# Patient Record
Sex: MALE | Race: WHITE | NOT HISPANIC OR LATINO | Employment: FULL TIME
[De-identification: names, ages, dates, MRNs, and addresses within clinical notes are randomized per-mention and may not be internally consistent; named-entity substitution may affect disease eponyms.]

---

## 2017-10-17 ENCOUNTER — HISTORIC RESULTS (OUTPATIENT)
Dept: ADMINISTRATIVE | Age: 37
End: 2017-10-17

## 2018-09-17 ENCOUNTER — RECORDS - HEALTHEAST (OUTPATIENT)
Dept: LAB | Facility: CLINIC | Age: 38
End: 2018-09-17

## 2018-09-17 LAB
ALBUMIN SERPL-MCNC: 3.8 G/DL (ref 3.5–5)
ALP SERPL-CCNC: 80 U/L (ref 45–120)
ALT SERPL W P-5'-P-CCNC: 19 U/L (ref 0–45)
ANION GAP SERPL CALCULATED.3IONS-SCNC: 7 MMOL/L (ref 5–18)
AST SERPL W P-5'-P-CCNC: 13 U/L (ref 0–40)
BASOPHILS # BLD AUTO: 0 THOU/UL (ref 0–0.2)
BASOPHILS NFR BLD AUTO: 0 % (ref 0–2)
BILIRUB SERPL-MCNC: 0.7 MG/DL (ref 0–1)
BUN SERPL-MCNC: 11 MG/DL (ref 8–22)
CALCIUM SERPL-MCNC: 9 MG/DL (ref 8.5–10.5)
CHLORIDE BLD-SCNC: 108 MMOL/L (ref 98–107)
CHOLEST SERPL-MCNC: 169 MG/DL
CO2 SERPL-SCNC: 22 MMOL/L (ref 22–31)
CREAT SERPL-MCNC: 0.77 MG/DL (ref 0.7–1.3)
EOSINOPHIL # BLD AUTO: 0.1 THOU/UL (ref 0–0.4)
EOSINOPHIL NFR BLD AUTO: 2 % (ref 0–6)
ERYTHROCYTE [DISTWIDTH] IN BLOOD BY AUTOMATED COUNT: 12.4 % (ref 11–14.5)
FASTING STATUS PATIENT QL REPORTED: YES
GFR SERPL CREATININE-BSD FRML MDRD: >60 ML/MIN/1.73M2
GLUCOSE BLD-MCNC: 209 MG/DL (ref 70–125)
HCT VFR BLD AUTO: 47.8 % (ref 40–54)
HDLC SERPL-MCNC: 26 MG/DL
HGB BLD-MCNC: 16.5 G/DL (ref 14–18)
LDLC SERPL CALC-MCNC: 116 MG/DL
LYMPHOCYTES # BLD AUTO: 3 THOU/UL (ref 0.8–4.4)
LYMPHOCYTES NFR BLD AUTO: 40 % (ref 20–40)
MCH RBC QN AUTO: 29.8 PG (ref 27–34)
MCHC RBC AUTO-ENTMCNC: 34.5 G/DL (ref 32–36)
MCV RBC AUTO: 86 FL (ref 80–100)
MONOCYTES # BLD AUTO: 0.6 THOU/UL (ref 0–0.9)
MONOCYTES NFR BLD AUTO: 8 % (ref 2–10)
NEUTROPHILS # BLD AUTO: 3.6 THOU/UL (ref 2–7.7)
NEUTROPHILS NFR BLD AUTO: 49 % (ref 50–70)
PLATELET # BLD AUTO: 200 THOU/UL (ref 140–440)
PMV BLD AUTO: 10.2 FL (ref 8.5–12.5)
POTASSIUM BLD-SCNC: 4.3 MMOL/L (ref 3.5–5)
PROT SERPL-MCNC: 6.6 G/DL (ref 6–8)
RBC # BLD AUTO: 5.54 MILL/UL (ref 4.4–6.2)
SODIUM SERPL-SCNC: 137 MMOL/L (ref 136–145)
TRIGL SERPL-MCNC: 135 MG/DL
WBC: 7.4 THOU/UL (ref 4–11)

## 2018-09-18 LAB — HBA1C MFR BLD: 8.8 % (ref 4.2–6.1)

## 2018-12-11 ENCOUNTER — RECORDS - HEALTHEAST (OUTPATIENT)
Dept: LAB | Facility: CLINIC | Age: 38
End: 2018-12-11

## 2018-12-11 LAB
ALBUMIN UR-MCNC: NEGATIVE MG/DL
AMORPH CRY #/AREA URNS HPF: ABNORMAL /[HPF]
APPEARANCE UR: ABNORMAL
BACTERIA #/AREA URNS HPF: ABNORMAL HPF
BILIRUB UR QL STRIP: NEGATIVE
COLOR UR AUTO: YELLOW
GLUCOSE UR STRIP-MCNC: ABNORMAL MG/DL
HGB UR QL STRIP: NEGATIVE
KETONES UR STRIP-MCNC: NEGATIVE MG/DL
LEUKOCYTE ESTERASE UR QL STRIP: NEGATIVE
MUCOUS THREADS #/AREA URNS LPF: ABNORMAL LPF
NITRATE UR QL: NEGATIVE
PH UR STRIP: 5 [PH] (ref 4.5–8)
RBC #/AREA URNS AUTO: ABNORMAL HPF
SP GR UR STRIP: 1.03 (ref 1–1.03)
SQUAMOUS #/AREA URNS AUTO: ABNORMAL LPF
UROBILINOGEN UR STRIP-ACNC: ABNORMAL
WBC #/AREA URNS AUTO: ABNORMAL HPF

## 2019-04-11 ENCOUNTER — RECORDS - HEALTHEAST (OUTPATIENT)
Dept: ADMINISTRATIVE | Facility: OTHER | Age: 39
End: 2019-04-11

## 2019-04-11 LAB
LAB AP CHARGES (HE HISTORICAL CONVERSION): NORMAL
PATH REPORT.COMMENTS IMP SPEC: NORMAL
PATH REPORT.FINAL DX SPEC: NORMAL
PATH REPORT.GROSS SPEC: NORMAL
PATH REPORT.MICROSCOPIC SPEC OTHER STN: NORMAL
PATH REPORT.RELEVANT HX SPEC: NORMAL
RESULT FLAG (HE HISTORICAL CONVERSION): NORMAL

## 2019-11-21 ENCOUNTER — RECORDS - HEALTHEAST (OUTPATIENT)
Dept: LAB | Facility: CLINIC | Age: 39
End: 2019-11-21

## 2019-11-21 LAB
ALBUMIN SERPL-MCNC: 3.7 G/DL (ref 3.5–5)
ALP SERPL-CCNC: 93 U/L (ref 45–120)
ALT SERPL W P-5'-P-CCNC: 16 U/L (ref 0–45)
ANION GAP SERPL CALCULATED.3IONS-SCNC: 6 MMOL/L (ref 5–18)
AST SERPL W P-5'-P-CCNC: 10 U/L (ref 0–40)
BASOPHILS # BLD AUTO: 0 THOU/UL (ref 0–0.2)
BASOPHILS NFR BLD AUTO: 1 % (ref 0–2)
BILIRUB SERPL-MCNC: 0.7 MG/DL (ref 0–1)
BUN SERPL-MCNC: 13 MG/DL (ref 8–22)
CALCIUM SERPL-MCNC: 8.8 MG/DL (ref 8.5–10.5)
CHLORIDE BLD-SCNC: 107 MMOL/L (ref 98–107)
CHOLEST SERPL-MCNC: 156 MG/DL
CO2 SERPL-SCNC: 24 MMOL/L (ref 22–31)
CREAT SERPL-MCNC: 0.85 MG/DL (ref 0.7–1.3)
EOSINOPHIL # BLD AUTO: 0.2 THOU/UL (ref 0–0.4)
EOSINOPHIL NFR BLD AUTO: 2 % (ref 0–6)
ERYTHROCYTE [DISTWIDTH] IN BLOOD BY AUTOMATED COUNT: 12.4 % (ref 11–14.5)
FASTING STATUS PATIENT QL REPORTED: ABNORMAL
GFR SERPL CREATININE-BSD FRML MDRD: >60 ML/MIN/1.73M2
GLUCOSE BLD-MCNC: 303 MG/DL (ref 70–125)
HCT VFR BLD AUTO: 48.3 % (ref 40–54)
HDLC SERPL-MCNC: 28 MG/DL
HGB BLD-MCNC: 16.7 G/DL (ref 14–18)
LDLC SERPL CALC-MCNC: 104 MG/DL
LYMPHOCYTES # BLD AUTO: 2.7 THOU/UL (ref 0.8–4.4)
LYMPHOCYTES NFR BLD AUTO: 39 % (ref 20–40)
MCH RBC QN AUTO: 29.7 PG (ref 27–34)
MCHC RBC AUTO-ENTMCNC: 34.6 G/DL (ref 32–36)
MCV RBC AUTO: 86 FL (ref 80–100)
MONOCYTES # BLD AUTO: 0.6 THOU/UL (ref 0–0.9)
MONOCYTES NFR BLD AUTO: 8 % (ref 2–10)
NEUTROPHILS # BLD AUTO: 3.4 THOU/UL (ref 2–7.7)
NEUTROPHILS NFR BLD AUTO: 50 % (ref 50–70)
PLATELET # BLD AUTO: 203 THOU/UL (ref 140–440)
PMV BLD AUTO: 10.6 FL (ref 8.5–12.5)
POTASSIUM BLD-SCNC: 4.5 MMOL/L (ref 3.5–5)
PROT SERPL-MCNC: 6.4 G/DL (ref 6–8)
RBC # BLD AUTO: 5.63 MILL/UL (ref 4.4–6.2)
SODIUM SERPL-SCNC: 137 MMOL/L (ref 136–145)
TRIGL SERPL-MCNC: 122 MG/DL
WBC: 6.8 THOU/UL (ref 4–11)

## 2019-11-22 LAB — HBA1C MFR BLD: 9.7 % (ref 4.2–6.1)

## 2020-02-04 ENCOUNTER — RECORDS - HEALTHEAST (OUTPATIENT)
Dept: ADMINISTRATIVE | Facility: OTHER | Age: 40
End: 2020-02-04

## 2020-02-18 ENCOUNTER — HOSPITAL ENCOUNTER (OUTPATIENT)
Dept: ULTRASOUND IMAGING | Facility: CLINIC | Age: 40
Discharge: HOME | End: 2020-02-18
Attending: SURGERY

## 2020-02-18 DIAGNOSIS — R10.84 ABDOMINAL PAIN, GENERALIZED: ICD-10-CM

## 2020-08-24 ENCOUNTER — RECORDS - HEALTHEAST (OUTPATIENT)
Dept: LAB | Facility: CLINIC | Age: 40
End: 2020-08-24

## 2020-08-24 LAB
ALBUMIN SERPL-MCNC: 4 G/DL (ref 3.5–5)
ALP SERPL-CCNC: 120 U/L (ref 45–120)
ALT SERPL W P-5'-P-CCNC: 14 U/L (ref 0–45)
ANION GAP SERPL CALCULATED.3IONS-SCNC: 8 MMOL/L (ref 5–18)
AST SERPL W P-5'-P-CCNC: 10 U/L (ref 0–40)
BASOPHILS # BLD AUTO: 0.1 THOU/UL (ref 0–0.2)
BASOPHILS NFR BLD AUTO: 1 % (ref 0–2)
BILIRUB SERPL-MCNC: 0.4 MG/DL (ref 0–1)
BUN SERPL-MCNC: 14 MG/DL (ref 8–22)
CALCIUM SERPL-MCNC: 9.3 MG/DL (ref 8.5–10.5)
CHLORIDE BLD-SCNC: 104 MMOL/L (ref 98–107)
CHOLEST SERPL-MCNC: 163 MG/DL
CO2 SERPL-SCNC: 25 MMOL/L (ref 22–31)
CREAT SERPL-MCNC: 0.97 MG/DL (ref 0.7–1.3)
EOSINOPHIL # BLD AUTO: 0.2 THOU/UL (ref 0–0.4)
EOSINOPHIL NFR BLD AUTO: 2 % (ref 0–6)
ERYTHROCYTE [DISTWIDTH] IN BLOOD BY AUTOMATED COUNT: 12.6 % (ref 11–14.5)
FASTING STATUS PATIENT QL REPORTED: ABNORMAL
GFR SERPL CREATININE-BSD FRML MDRD: >60 ML/MIN/1.73M2
GLUCOSE BLD-MCNC: 267 MG/DL (ref 70–125)
HCT VFR BLD AUTO: 52.6 % (ref 40–54)
HDLC SERPL-MCNC: 33 MG/DL
HGB BLD-MCNC: 17.4 G/DL (ref 14–18)
IMM GRANULOCYTES # BLD: 0 THOU/UL
IMM GRANULOCYTES NFR BLD: 0 %
LDLC SERPL CALC-MCNC: 104 MG/DL
LYMPHOCYTES # BLD AUTO: 3.3 THOU/UL (ref 0.8–4.4)
LYMPHOCYTES NFR BLD AUTO: 36 % (ref 20–40)
MCH RBC QN AUTO: 29.1 PG (ref 27–34)
MCHC RBC AUTO-ENTMCNC: 33.1 G/DL (ref 32–36)
MCV RBC AUTO: 88 FL (ref 80–100)
MONOCYTES # BLD AUTO: 0.6 THOU/UL (ref 0–0.9)
MONOCYTES NFR BLD AUTO: 7 % (ref 2–10)
NEUTROPHILS # BLD AUTO: 5.1 THOU/UL (ref 2–7.7)
NEUTROPHILS NFR BLD AUTO: 55 % (ref 50–70)
PLATELET # BLD AUTO: 243 THOU/UL (ref 140–440)
PMV BLD AUTO: 10.3 FL (ref 8.5–12.5)
POTASSIUM BLD-SCNC: 4.6 MMOL/L (ref 3.5–5)
PROT SERPL-MCNC: 7.1 G/DL (ref 6–8)
RBC # BLD AUTO: 5.97 MILL/UL (ref 4.4–6.2)
SODIUM SERPL-SCNC: 137 MMOL/L (ref 136–145)
TRIGL SERPL-MCNC: 130 MG/DL
WBC: 9.3 THOU/UL (ref 4–11)

## 2020-08-25 LAB — HBA1C MFR BLD: 10.3 %

## 2021-05-13 ENCOUNTER — OFFICE VISIT (OUTPATIENT)
Dept: ENDOCRINOLOGY | Facility: CLINIC | Age: 41
End: 2021-05-13
Payer: COMMERCIAL

## 2021-05-13 VITALS
HEART RATE: 90 BPM | RESPIRATION RATE: 14 BRPM | DIASTOLIC BLOOD PRESSURE: 80 MMHG | BODY MASS INDEX: 26.6 KG/M2 | HEIGHT: 71 IN | SYSTOLIC BLOOD PRESSURE: 130 MMHG | WEIGHT: 190 LBS

## 2021-05-13 DIAGNOSIS — Z72.0 TOBACCO ABUSE: ICD-10-CM

## 2021-05-13 DIAGNOSIS — E11.65 TYPE 2 DIABETES MELLITUS WITH HYPERGLYCEMIA, WITHOUT LONG-TERM CURRENT USE OF INSULIN (H): Primary | ICD-10-CM

## 2021-05-13 PROCEDURE — 99204 OFFICE O/P NEW MOD 45 MIN: CPT | Performed by: INTERNAL MEDICINE

## 2021-05-13 RX ORDER — GLIPIZIDE 5 MG/1
5 TABLET, FILM COATED, EXTENDED RELEASE ORAL
COMMUNITY
Start: 2021-04-21

## 2021-05-13 RX ORDER — PROCHLORPERAZINE 25 MG/1
1 SUPPOSITORY RECTAL DAILY
Qty: 1 EACH | Refills: 0 | Status: SHIPPED | OUTPATIENT
Start: 2021-05-13

## 2021-05-13 RX ORDER — PROCHLORPERAZINE 25 MG/1
1 SUPPOSITORY RECTAL
Qty: 3 EACH | Refills: 11 | Status: SHIPPED | OUTPATIENT
Start: 2021-05-13 | End: 2022-06-03

## 2021-05-13 RX ORDER — PROCHLORPERAZINE 25 MG/1
1 SUPPOSITORY RECTAL
Qty: 2 EACH | Refills: 1 | Status: SHIPPED | OUTPATIENT
Start: 2021-05-13 | End: 2022-04-29

## 2021-05-13 RX ORDER — ESCITALOPRAM OXALATE 10 MG/1
10 TABLET ORAL
COMMUNITY
Start: 2021-04-21

## 2021-05-13 RX ORDER — GLIPIZIDE 10 MG/1
10 TABLET, FILM COATED, EXTENDED RELEASE ORAL
COMMUNITY
Start: 2021-04-21

## 2021-05-13 RX ORDER — NICOTINE 21 MG/24HR
1 PATCH, TRANSDERMAL 24 HOURS TRANSDERMAL EVERY 24 HOURS
Qty: 28 PATCH | Refills: 11 | Status: SHIPPED | OUTPATIENT
Start: 2021-05-13

## 2021-05-13 RX ORDER — DAPAGLIFLOZIN 5 MG/1
5 TABLET, FILM COATED ORAL DAILY
Qty: 30 TABLET | Refills: 11 | Status: SHIPPED | OUTPATIENT
Start: 2021-05-13

## 2021-05-13 ASSESSMENT — MIFFLIN-ST. JEOR: SCORE: 1783.96

## 2021-05-13 NOTE — LETTER
"    5/13/2021         RE: Marcus Mera  [address] [facility] [address]        Dear Colleague,    Thank you for referring your patient, Marcus Mera, to the [facility] ENDOCRINOLOGY. Please see a copy of my visit note below.    CC: DM    HPI:   Patient presents for management of DM.   Diagnosis ~5 years ago.     He does not follow any particular diet for his DM.   Recently met with diabetes education and found very helpful.   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     He has not been taking metformin regularly due to GI upset.   He is taking 15 mg of glipizide XL every day. He was not taking this consistently until recently as he was not sure which of his medications was causing his GI upset.     Began checking glucose 3/day recently. Before only once a day.   He did not bring his meter today. Running in 200-300 range.     ROS: 10 point ROS neg other than the symptoms noted above in the HPI.    PMH:   DM  Depression  DENIA    Meds:  Current Outpatient Medications   Medication     blood glucose (CONTOUR NEXT TEST) test strip     escitalopram (LEXAPRO) 10 MG tablet     glipiZIDE (GLUCOTROL XL) 10 MG 24 hr tablet     glipiZIDE (GLUCOTROL XL) 5 MG 24 hr tablet     metFORMIN (GLUCOPHAGE) 1000 MG tablet     No current facility-administered medications for this visit.       FHX:   Mother and grandmother with DM.     SHX:  1/2 PPD    Exam:   Vital signs:      BP: 130/80 Pulse: 90   Resp: 14       Height: 180.3 cm (5' 11\") Weight: 86.2 kg (190 lb)  Estimated body mass index is 26.5 kg/m  as calculated from the following:    Height as of this encounter: 1.803 m (5' 11\").    Weight as of this encounter: 86.2 kg (190 lb).  Gen: In NAD.   HEENT: no proptosis or lid lag, EOMI, MMM.   Card: S1 S2 RRR no m/r/g. no LE edema.   Pulm: CTA b/l.   GI: NT ND +BS.   MSK: no gross deformities.   Derm: no rashes or lesions.   Neuro: no tremor, +2 DTR's. Normal monofilament.     A/P:   Type 2 " DM - Chronic and uncontrolled. GI upset with metformin. Inconsistent glipizide use until the last week. Discussed GLP-1 RA's and SGLT-2i's. He still needs lots of education. Discussed CGM as means to help him avoid hypoglycemia and learn more about his DM.   -Nicotine patches prescribed.   -Schedule an eye exam.   -Follow up with diabetes education as planned.   -Bring meter to all clinic visits.   -Continue glipizide for now.   -Continue to hold metformin.   -Rx for farxiga 5 mg every day.  -Labs in 1 month. We will increase farxiga at that time if needed.   -See me in 3 months.   -Rx for DEXCOM sent to [facility]. They will contact once approved.   -ASA not indicated.  -BP: controlled.  -NAFL/MUHAMMAD: normal ALT and AST in 2020.   -Lipids: , Trg 130, HDL 33 in 2020.   -Microalbumin due. ACEi not indicated.   -Eyes: due for exam. Explained reasoning for examination.   -Smokin/2 PPD. Rx for nicotine patches.         Virgilio Cedillo M.D    CC:  Bernadette Trevizo MD    [address] [address]    Phone: [phone]    Fax: [phone]        Again, thank you for allowing me to participate in the care of your patient.        Sincerely,        Virgilio Cedillo MD

## 2021-05-13 NOTE — LETTER
"    5/13/2021         RE: Marcus Mera  [address] [facility] [address]        Dear Colleague,    Thank you for referring your patient, Marcus Mera, to the [facility] ENDOCRINOLOGY. Please see a copy of my visit note below.    CC: DM    HPI:   Patient presents for management of DM.   Diagnosis ~5 years ago.     He does not follow any particular diet for his DM.   Recently met with diabetes education and found very helpful.   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     He has not been taking metformin regularly due to GI upset.   He is taking 15 mg of glipizide XL every day. He was not taking this consistently until recently as he was not sure which of his medications was causing his GI upset.     Began checking glucose 3/day recently. Before only once a day.   He did not bring his meter today. Running in 200-300 range.     ROS: 10 point ROS neg other than the symptoms noted above in the HPI.    PMH:   DM  Depression  DENIA    Meds:  Current Outpatient Medications   Medication     blood glucose (CONTOUR NEXT TEST) test strip     escitalopram (LEXAPRO) 10 MG tablet     glipiZIDE (GLUCOTROL XL) 10 MG 24 hr tablet     glipiZIDE (GLUCOTROL XL) 5 MG 24 hr tablet     metFORMIN (GLUCOPHAGE) 1000 MG tablet     No current facility-administered medications for this visit.       FHX:   Mother and grandmother with DM.     SHX:  1/2 PPD    Exam:   Vital signs:      BP: 130/80 Pulse: 90   Resp: 14       Height: 180.3 cm (5' 11\") Weight: 86.2 kg (190 lb)  Estimated body mass index is 26.5 kg/m  as calculated from the following:    Height as of this encounter: 1.803 m (5' 11\").    Weight as of this encounter: 86.2 kg (190 lb).  Gen: In NAD.   HEENT: no proptosis or lid lag, EOMI, MMM.   Card: S1 S2 RRR no m/r/g. no LE edema.   Pulm: CTA b/l.   GI: NT ND +BS.   MSK: no gross deformities.   Derm: no rashes or lesions.   Neuro: no tremor, +2 DTR's. Normal monofilament.     A/P:   Type 2 " DM - Chronic and uncontrolled. GI upset with metformin. Inconsistent glipizide use until the last week. Discussed GLP-1 RA's and SGLT-2i's. He still needs lots of education. Discussed CGM as means to help him avoid hypoglycemia and learn more about his DM.   -Nicotine patches prescribed.   -Schedule an eye exam.   -Follow up with diabetes education as planned.   -Bring meter to all clinic visits.   -Continue glipizide for now.   -Continue to hold metformin.   -Rx for farxiga 5 mg every day.  -Labs in 1 month. We will increase farxiga at that time if needed.   -See me in 3 months.   -Rx for DEXCOM sent to [facility]. They will contact once approved.   -ASA not indicated.  -BP: controlled.  -NAFL/MUHAMMAD: normal ALT and AST in 2020.   -Lipids: , Trg 130, HDL 33 in 2020.   -Microalbumin due. ACEi not indicated.   -Eyes: due for exam. Explained reasoning for examination.   -Smokin/2 PPD. Rx for nicotine patches.         Virgilio Cedillo M.D    CC:  Bernadette Trevizo MD    [address] [address]    Phone: [phone]    Fax: [phone]        Again, thank you for allowing me to participate in the care of your patient.        Sincerely,        Virgilio Cedillo MD

## 2021-05-13 NOTE — PROGRESS NOTES
"CC: DM    HPI:   Patient presents for management of DM.   Diagnosis ~5 years ago.     He does not follow any particular diet for his DM.   Recently met with diabetes education and found very helpful.   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     He has not been taking metformin regularly due to GI upset.   He is taking 15 mg of glipizide XL every day. He was not taking this consistently until recently as he was not sure which of his medications was causing his GI upset.     Began checking glucose 3/day recently. Before only once a day.   He did not bring his meter today. Running in 200-300 range.     ROS: 10 point ROS neg other than the symptoms noted above in the HPI.    PMH:   DM  Depression  DENIA    Meds:  Current Outpatient Medications   Medication     blood glucose (CONTOUR NEXT TEST) test strip     escitalopram (LEXAPRO) 10 MG tablet     glipiZIDE (GLUCOTROL XL) 10 MG 24 hr tablet     glipiZIDE (GLUCOTROL XL) 5 MG 24 hr tablet     metFORMIN (GLUCOPHAGE) 1000 MG tablet     No current facility-administered medications for this visit.       FHX:   Mother and grandmother with DM.     SHX:  1/2 PPD    Exam:   Vital signs:      BP: 130/80 Pulse: 90   Resp: 14       Height: 180.3 cm (5' 11\") Weight: 86.2 kg (190 lb)  Estimated body mass index is 26.5 kg/m  as calculated from the following:    Height as of this encounter: 1.803 m (5' 11\").    Weight as of this encounter: 86.2 kg (190 lb).  Gen: In NAD.   HEENT: no proptosis or lid lag, EOMI, MMM.   Card: S1 S2 RRR no m/r/g. no LE edema.   Pulm: CTA b/l.   GI: NT ND +BS.   MSK: no gross deformities.   Derm: no rashes or lesions.   Neuro: no tremor, +2 DTR's. Normal monofilament.     A/P:   Type 2 DM - Chronic and uncontrolled. GI upset with metformin. Inconsistent glipizide use until the last week. Discussed GLP-1 RA's and SGLT-2i's. He still needs lots of education. Discussed CGM as means to help him avoid hypoglycemia and learn more about his DM. "   -Nicotine patches prescribed.   -Schedule an eye exam.   -Follow up with diabetes education as planned.   -Bring meter to all clinic visits.   -Continue glipizide for now.   -Continue to hold metformin.   -Rx for farxiga 5 mg every day.  -Labs in 1 month. We will increase farxiga at that time if needed.   -See me in 3 months.   -Rx for DEXCOM sent to [facility]. They will contact once approved.   -ASA not indicated.  -BP: controlled.  -NAFL/MUHAMMAD: normal ALT and AST in 2020.   -Lipids: , Trg 130, HDL 33 in 2020.   -Microalbumin due. ACEi not indicated.   -Eyes: due for exam. Explained reasoning for examination.   -Smokin/2 PPD. Rx for nicotine patches.         Virgilio Cedillo M.D    CC:  Bernadette Trevizo MD    [address] [address]    Phone: [phone]    Fax: [phone]

## 2021-05-13 NOTE — NURSING NOTE
"Chief Complaint   Patient presents with     Consult     Diabetes       Initial /80 (BP Location: Right arm, Patient Position: Sitting, Cuff Size: Adult Regular)   Pulse 90   Resp 14   Ht 1.803 m (5' 11\")   Wt 86.2 kg (190 lb)   BMI 26.50 kg/m   Estimated body mass index is 26.5 kg/m  as calculated from the following:    Height as of this encounter: 1.803 m (5' 11\").    Weight as of this encounter: 86.2 kg (190 lb).  BP completed using cuff size: regular  Medications and allergies reviewed.      Breonna MUNIZ MA    "

## 2021-05-13 NOTE — PROGRESS NOTES
Copy of OV note faxed as requested to:  Bernadette Trevizo MD    [address] [address]    Phone: [phone]    Fax: [phone]

## 2021-05-13 NOTE — PATIENT INSTRUCTIONS
-Nicotine patches prescribed.     -Schedule an eye exam.     -Follow up with diabetes education as planned.     -Bring meter to all clinic visits.     -Continue glipizide for now.   -Continue to hold metformin.   -Rx for farxiga 5 mg every day.    -Rx for DEXCOM sent to [facility]. They will contact once approved.     -Labs in 1 month. We will increase farxiga at that time if needed.     -See me in 3 months.

## 2022-02-12 ENCOUNTER — HEALTH MAINTENANCE LETTER (OUTPATIENT)
Age: 42
End: 2022-02-12

## 2022-04-27 DIAGNOSIS — E11.65 TYPE 2 DIABETES MELLITUS WITH HYPERGLYCEMIA, WITHOUT LONG-TERM CURRENT USE OF INSULIN (H): ICD-10-CM

## 2022-04-27 NOTE — TELEPHONE ENCOUNTER
"Requested Prescriptions   Pending Prescriptions Disp Refills     Continuous Blood Gluc Sensor (DEXCOM G6 SENSOR) MISC 3 each 11     Si each every 10 days       There is no refill protocol information for this order      Signed Prescriptions Disp Refills    Continuous Blood Gluc Transmit (DEXCOM G6 TRANSMITTER) MISC 1 each 0     Si each every 3 months **SCHEDULE APPOINTMENT TO ESTABLISH CARE WITH NEW PROVIDER**       Diabetic Supplies Protocol Failed - 2022  8:26 AM        Failed - Recent (6 mo) or future (30 days) visit within the authorizing provider's specialty     Patient had office visit in the last 6 months or has a visit in the next 30 days with authorizing provider.  See \"Patient Info\" tab in inbasket, or \"Choose Columns\" in Meds & Orders section of the refill encounter.            Passed - Medication is active on med list        Passed - Patient is 18 years of age or older             Last office visit: Visit date not found with prescribing provider:  Dr. Cedillo    Future Office Visit:          [facility]        "

## 2022-04-29 RX ORDER — PROCHLORPERAZINE 25 MG/1
1 SUPPOSITORY RECTAL
Qty: 1 EACH | Refills: 0 | Status: SHIPPED | OUTPATIENT
Start: 2022-04-29

## 2022-05-26 RX ORDER — PROCHLORPERAZINE 25 MG/1
1 SUPPOSITORY RECTAL
Qty: 3 EACH | Refills: 11 | OUTPATIENT
Start: 2022-05-26 | End: 2024-08-08

## 2022-05-31 DIAGNOSIS — E11.65 TYPE 2 DIABETES MELLITUS WITH HYPERGLYCEMIA, WITHOUT LONG-TERM CURRENT USE OF INSULIN (H): ICD-10-CM

## 2022-05-31 NOTE — TELEPHONE ENCOUNTER
Requested Prescriptions   Pending Prescriptions Disp Refills     Continuous Blood Gluc Sensor (DEXCOM G6 SENSOR) MISC 3 each 11     Si each every 10 days       There is no refill protocol information for this order        Last office visit: Visit date not found with prescribing provider:  Dr. Cedillo    Future Office Visit:          [facility]

## 2022-06-03 RX ORDER — PROCHLORPERAZINE 25 MG/1
1 SUPPOSITORY RECTAL
Qty: 9 EACH | Refills: 0 | Status: SHIPPED | OUTPATIENT
Start: 2022-06-03 | End: 2022-08-29

## 2022-06-03 NOTE — TELEPHONE ENCOUNTER
Continuous Blood Gluc Sensor (DEXCOM G6 SENSOR) [facility]    5/13/2021  [facility] Endocrinology     Virgilio Cedillo MD    Endocrinology, Diabetes, and Metabolism

## 2022-08-29 DIAGNOSIS — E11.65 TYPE 2 DIABETES MELLITUS WITH HYPERGLYCEMIA, WITHOUT LONG-TERM CURRENT USE OF INSULIN (H): ICD-10-CM

## 2022-08-29 RX ORDER — PROCHLORPERAZINE 25 MG/1
1 SUPPOSITORY RECTAL
Qty: 3 EACH | Refills: 0 | Status: SHIPPED | OUTPATIENT
Start: 2022-08-29

## 2022-10-09 ENCOUNTER — HEALTH MAINTENANCE LETTER (OUTPATIENT)
Age: 42
End: 2022-10-09

## 2023-02-18 ENCOUNTER — HEALTH MAINTENANCE LETTER (OUTPATIENT)
Age: 43
End: 2023-02-18

## 2023-05-21 ENCOUNTER — HEALTH MAINTENANCE LETTER (OUTPATIENT)
Age: 43
End: 2023-05-21

## 2024-01-06 ENCOUNTER — HEALTH MAINTENANCE LETTER (OUTPATIENT)
Age: 44
End: 2024-01-06

## 2024-03-16 ENCOUNTER — HEALTH MAINTENANCE LETTER (OUTPATIENT)
Age: 44
End: 2024-03-16